# Patient Record
Sex: FEMALE | Race: WHITE
[De-identification: names, ages, dates, MRNs, and addresses within clinical notes are randomized per-mention and may not be internally consistent; named-entity substitution may affect disease eponyms.]

---

## 2018-03-04 ENCOUNTER — HOSPITAL ENCOUNTER (EMERGENCY)
Dept: HOSPITAL 45 - C.EDB | Age: 33
Discharge: HOME | End: 2018-03-04
Payer: COMMERCIAL

## 2018-03-04 VITALS
BODY MASS INDEX: 33.98 KG/M2 | WEIGHT: 191.8 LBS | HEIGHT: 62.99 IN | BODY MASS INDEX: 33.98 KG/M2 | HEIGHT: 62.99 IN | WEIGHT: 191.8 LBS

## 2018-03-04 VITALS — TEMPERATURE: 97.34 F

## 2018-03-04 VITALS — DIASTOLIC BLOOD PRESSURE: 70 MMHG | OXYGEN SATURATION: 96 % | HEART RATE: 69 BPM | SYSTOLIC BLOOD PRESSURE: 99 MMHG

## 2018-03-04 VITALS — OXYGEN SATURATION: 99 %

## 2018-03-04 DIAGNOSIS — G44.209: Primary | ICD-10-CM

## 2018-03-04 DIAGNOSIS — R11.0: ICD-10-CM

## 2018-03-04 DIAGNOSIS — R10.11: ICD-10-CM

## 2018-03-04 DIAGNOSIS — F17.200: ICD-10-CM

## 2018-03-04 DIAGNOSIS — N30.01: ICD-10-CM

## 2018-03-04 DIAGNOSIS — R56.9: ICD-10-CM

## 2018-03-04 DIAGNOSIS — R20.0: ICD-10-CM

## 2018-03-04 DIAGNOSIS — H53.149: ICD-10-CM

## 2018-03-04 LAB
ALBUMIN SERPL-MCNC: 3.9 GM/DL (ref 3.4–5)
ALP SERPL-CCNC: 81 U/L (ref 45–117)
ALT SERPL-CCNC: 22 U/L (ref 12–78)
AST SERPL-CCNC: 17 U/L (ref 15–37)
BASOPHILS # BLD: 0.01 K/UL (ref 0–0.2)
BASOPHILS NFR BLD: 0.2 %
BUN SERPL-MCNC: 8 MG/DL (ref 7–18)
CALCIUM SERPL-MCNC: 9 MG/DL (ref 8.5–10.1)
CO2 SERPL-SCNC: 25 MMOL/L (ref 21–32)
CREAT SERPL-MCNC: 0.9 MG/DL (ref 0.6–1.2)
EOS ABS #: 0.11 K/UL (ref 0–0.5)
EOSINOPHIL NFR BLD AUTO: 181 K/UL (ref 130–400)
GLUCOSE SERPL-MCNC: 86 MG/DL (ref 70–99)
HCT VFR BLD CALC: 43.2 % (ref 37–47)
HGB BLD-MCNC: 15.2 G/DL (ref 12–16)
IG#: 0.02 K/UL (ref 0–0.02)
IMM GRANULOCYTES NFR BLD AUTO: 19.5 %
INR PPP: 1.1 (ref 0.9–1.1)
LYMPHOCYTES # BLD: 1.28 K/UL (ref 1.2–3.4)
MCH RBC QN AUTO: 31.8 PG (ref 25–34)
MCHC RBC AUTO-ENTMCNC: 35.2 G/DL (ref 32–36)
MCV RBC AUTO: 90.4 FL (ref 80–100)
MONO ABS #: 0.54 K/UL (ref 0.11–0.59)
MONOCYTES NFR BLD: 8.2 %
NEUT ABS #: 4.6 K/UL (ref 1.4–6.5)
NEUTROPHILS # BLD AUTO: 1.7 %
NEUTROPHILS NFR BLD AUTO: 70.1 %
PHOSPHATE SERPL-MCNC: 3.2 MG/DL (ref 2.5–4.9)
PMV BLD AUTO: 10 FL (ref 7.4–10.4)
POTASSIUM SERPL-SCNC: 3.6 MMOL/L (ref 3.5–5.1)
PROT SERPL-MCNC: 8.1 GM/DL (ref 6.4–8.2)
PTT PATIENT: 29.4 SECONDS (ref 21–31)
RED CELL DISTRIBUTION WIDTH CV: 12.7 % (ref 11.5–14.5)
RED CELL DISTRIBUTION WIDTH SD: 41.6 FL (ref 36.4–46.3)
SODIUM SERPL-SCNC: 140 MMOL/L (ref 136–145)
WBC # BLD AUTO: 6.56 K/UL (ref 4.8–10.8)

## 2018-03-04 NOTE — DIAGNOSTIC IMAGING REPORT
SINGLE VIEW CHEST



CLINICAL HISTORY:  Seizure. Headache.



FINDINGS: An AP, portable, upright chest radiograph is obtained. No prior

studies are available for comparison at the time of dictation. The examination

is degraded by portable technique and patient rotation.  The cardiomediastinal

silhouette is unremarkable. There are low lung volumes. The lungs and pleural

spaces are clear. No pneumothorax is seen. The bony thorax is grossly intact.



IMPRESSION: No acute cardiopulmonary abnormality.







Electronically signed by:  Stevan Quick M.D.

3/4/2018 8:38 AM



Dictated Date/Time:  3/4/2018 8:38 AM

## 2018-03-04 NOTE — DIAGNOSTIC IMAGING REPORT
ULTRASOUND RIGHT UPPER QUADRANT ABDOMEN



CLINICAL HISTORY: Right upper quadrant abdominal pain. Nausea.



COMPARISON STUDY: Abdominal CT dated 6/5/2017.



TECHNIQUE: Real-time, grayscale, and color flow sonography of the right upper

quadrant of the abdomen was performed. Images are reviewed in the transverse and

longitudinal planes.



FINDINGS:



Liver: The liver is normal in size and echotexture. There is no intrahepatic

biliary ductal dilatation. The main portal vein is patent.



Gallbladder:  Small mobile gallstones are identified. There is no gallbladder

wall thickening or pericholecystic fluid. A sonographic Arredondo's sign is

reportedly absent. The common bile duct measures up to 0.5 cm in diameter.



Pancreas: Not well visualized due to overlying bowel gas.



Right kidney: Survey images of the right kidney demonstrate normal size and

echotexture. There is no hydronephrosis. There are nonobstructing right renal

calculi measuring up to 9 mm.



Ascites: None.





IMPRESSION:  



1. Cholelithiasis without sonographic evidence of acute cholecystitis.



2. Nonobstructing right renal calculi are identified. There is no

hydronephrosis.



3. The pancreas was not visualized due to overlying bowel gas.







Electronically signed by:  Stevan Quick M.D.

3/4/2018 9:18 AM



Dictated Date/Time:  3/4/2018 9:17 AM

## 2018-03-04 NOTE — EMERGENCY ROOM VISIT NOTE
History


First contact with patient:  07:35


Chief Complaint:  SEIZURE


Stated Complaint:  SEIZER,HEADACHE,NAUSEA


Nursing Triage Summary:  


Per patient's friend, "I went and got her from [place] and went to get her mom 


here.  She started convulsing about 10 minutes ago.  She has not taken her 


Prozac or Seraquil for 2 days."  Patient reports "I was feeling dizziness at 


[facility] in [place].  I sleep must of the way back.  I have no hx of seizure.  


I have a headache and abdominal pain."





History of Present Illness


The patient is a 32 year old female who presents to the Emergency Room with 

complaints of light sensitivity and a bad headache.  The patient states she was 

traveling home from [place], and stopped at [facility] in [place].  She states 

approximately 2 hours ago, while at [facility], she began experiencing dizziness, 

lightheadedness, and a bad headache associated with light sensitivity.  Patient 

states she got back in the car, and was feeling okay with the exception of some 

mild headache with nausea.  Patient states prior to this episode, she 

experienced some left arm tingling and numbness.  All traveling back with a 

male friend, the friend states approximately 45 minutes ago, the patient began 

gasping for air, and when he attempted to check her pulse and breathing, states 

she was not breathing and did not have a pulse for 2-5 minutes.  He states she 

then awoke up, and was completely normal.  She states a few minutes later, the 

patient began some mild twitching, then began twitching throughout her entire 

body, which she describes as "jumping out of her C".  He states this episode 

lasted for approximately 30 seconds.  The patient does not have a history of 

seizures.  She states while traveling, she has been 2 days without her Seroquel 

or Prozac because she did not have access to that, but when asked here in the 

emergency department, the male friend was advised by the patient to get the 

medications out of her black purse which was in the vehicle they were traveling 

and.  She states she has experienced similar symptoms in the past without the 

twitching when she has been without her medications.  The patient remembers the 

entire trip, but does not recall the twitching or seizure-like activity.  There 

was no loss of bowel or bladder function.  The patient denies any drug use, and 

states she had "a few sips of wine" last evening, but states "there was white 

stuff in it, and it tasted really bad".  The patient does admit to tobacco use 

and states she smokes 4 cigarettes per day.  She denies any chest pain or 

difficulty breathing.  She denies any ongoing paresthesias or weakness.  She 

denies any bowel or bladder dysfunction.  The patient does report some mild 

abdominal pain associated with nausea.





Review of Systems


A complete 10 point review of systems was reviewed with the patient with 

pertinent positives and negatives as per history of present illness. All else 

were negative.





Past Medical/Surgical History


Surgical Problems:


(1) Hx of  section








Family History





Patient reports no known family medical history.





Social History


Smoking Status:  Current Every Day Smoker


Alcohol Use:  occasionally


Marital Status:  in relationship


Housing Status:  lives with significant other


Occupation Status:  employed





Current/Historical Medications


Scheduled


Fluoxetine (Prozac), 20 MG PO QAM


Quetiapine Fumarate (Seroquel), 100 MG PO HS


Sulfa/Trimethoprim (Bactrim Ds 800MG/160MG), 1 TAB PO BID





Physical Exam


Vital Signs











  Date Time  Temp Pulse Resp B/P (MAP) Pulse Ox O2 Delivery O2 Flow Rate FiO2


 


3/4/18 10:51  69 16 99/70 96   


 


3/4/18 09:31  72 16 124/68 96 Room Air  


 


3/4/18 08:36  88 16 122/90 99 Room Air  


 


3/4/18 08:05  74      


 


3/4/18 08:00     99 Room Air  


 


3/4/18 07:26 36.3 86 18 123/82 97 Room Air  











Physical Exam


VITALS: Vitals are noted on the nurse's note and reviewed by myself.  Vital 

signs stable.


GENERAL: This is a 32-year-old white female, in no acute distress, 

nondiaphoretic, well-developed well-nourished.


SKIN: The skin was without rashes, erythema, edema, or bruising.  There is no 

tenting of the skin.  Capillary reflex less than 2 seconds.


HEAD: Normocephalic atraumatic.  


EARS: External auditory canals clear, tympanic membranes pearly gray without 

erythema or effusion bilaterally.


EYES: Pupils equal round and reactive to light and accommodation.  Conjunctivae 

without injection, sclerae without icterus.  Extraocular movements intact 

without nystagmus.  


NOSE: Patent, turbinates without inflammation or discharge.  No sinus 

tenderness.


MOUTH: Mucous membranes moist.  Tonsils are not enlarged.  Pharynx without 

erythema or exudate.  Uvula midline.  Airway patent.  Tongue does not deviate.  


NECK: Supple without nuchal rigidity.  No lymphadenopathy.  No thyromegaly.  

Cervical spine is nontender.  No JVD.


HEART: Regular rate and rhythm without murmurs gallops or rubs.


LUNGS: Clear to auscultation bilaterally without wheezes, rales or rhonchi.  No 

dullness to percussion.  No retractions or accessory muscle use.


ABDOMEN: Positive bowel sounds x 4.  Normal tympanic percussion.  Mild right 

upper quadrant tenderness, but otherwise soft, nontender, without masses or 

organomegaly.  Arredondo sign negative.  No guarding or rebound tenderness.


MUSCULOSKELETAL: No muscle atrophy, erythema, or edema noted.  Full range of 

motion without joint tenderness in all extremities.  No tenderness to 

palpation.  Normal gait.  Strength 5/5 throughout.


NEURO: Patient was alert and oriented to person place and time.  Normal 

sensation to light and sharp touch.  Deep tendon reflexes 2+ throughout.  No 

focal neurological deficits.





Medical Decision & Procedures


ER Provider


Diagnostic Interpretation:


SINGLE VIEW CHEST





CLINICAL HISTORY:  Seizure. Headache.





FINDINGS: An AP, portable, upright chest radiograph is obtained. No prior


studies are available for comparison at the time of dictation. The examination


is degraded by portable technique and patient rotation.  The cardiomediastinal


silhouette is unremarkable. There are low lung volumes. The lungs and pleural


spaces are clear. No pneumothorax is seen. The bony thorax is grossly intact.





IMPRESSION: No acute cardiopulmonary abnormality.











Electronically signed by:  Stevan Quick M.D.


3/4/2018 8:38 AM





Dictated Date/Time:  3/4/2018 8:38 AM








CT SCAN OF THE BRAIN WITHOUT IV CONTRAST





CLINICAL HISTORY: Seizure. Headache. Dizzy.





COMPARISON STUDY:  No priors.





TECHNIQUE: Unenhanced axial CT scan of the brain is performed from the vertex to


the skull base.  A dose lowering technique was utilized adhering to the


principles of ALARA.





CT DOSE: 537.48 mGy.cm





FINDINGS:





Brain parenchyma: The brain parenchyma is normal in appearance. There is no


hemorrhage, mass effect, or evidence of acute territorial ischemia by CT


criteria. Gray-white matter is preserved. No extra-axial fluid collection is


seen.





Ventricles, sulci, cisterns: Normal in configuration.





Intracranial vasculature: The visualized intracranial vasculature at the skull


base is normal in appearance.





Calvarium: Unremarkable.





Sinuses and mastoids: The visualized paranasal sinuses are clear. The mastoid


air cells are well pneumatized.





Orbits: The bony orbits are grossly intact.








IMPRESSION: No acute intracranial abnormality.











Electronically signed by:  Stevan Quick M.D.


3/4/2018 8:52 AM





Dictated Date/Time:  3/4/2018 8:50 AM








ULTRASOUND RIGHT UPPER QUADRANT ABDOMEN





CLINICAL HISTORY: Right upper quadrant abdominal pain. Nausea.





COMPARISON STUDY: Abdominal CT dated 2017.





TECHNIQUE: Real-time, grayscale, and color flow sonography of the right upper


quadrant of the abdomen was performed. Images are reviewed in the transverse and


longitudinal planes.





FINDINGS:





Liver: The liver is normal in size and echotexture. There is no intrahepatic


biliary ductal dilatation. The main portal vein is patent.





Gallbladder:  Small mobile gallstones are identified. There is no gallbladder


wall thickening or pericholecystic fluid. A sonographic Arredondo's sign is


reportedly absent. The common bile duct measures up to 0.5 cm in diameter.





Pancreas: Not well visualized due to overlying bowel gas.





Right kidney: Survey images of the right kidney demonstrate normal size and


echotexture. There is no hydronephrosis. There are nonobstructing right renal


calculi measuring up to 9 mm.





Ascites: None.








IMPRESSION:  





1. Cholelithiasis without sonographic evidence of acute cholecystitis.





2. Nonobstructing right renal calculi are identified. There is no


hydronephrosis.





3. The pancreas was not visualized due to overlying bowel gas.











Electronically signed by:  Stevan Quick M.D.


3/4/2018 9:18 AM





Dictated Date/Time:  3/4/2018 9:17 AM





Laboratory Results


3/4/18 07:55








Red Blood Count 4.78, Mean Corpuscular Volume 90.4, Mean Corpuscular Hemoglobin 

31.8, Mean Corpuscular Hemoglobin Concent 35.2, Mean Platelet Volume 10.0, 

Neutrophils (%) (Auto) 70.1, Lymphocytes (%) (Auto) 19.5, Monocytes (%) (Auto) 

8.2, Eosinophils (%) (Auto) 1.7, Basophils (%) (Auto) 0.2, Neutrophils # (Auto) 

4.60, Lymphocytes # (Auto) 1.28, Monocytes # (Auto) 0.54, Eosinophils # (Auto) 

0.11, Basophils # (Auto) 0.01





3/4/18 07:55

















Test


  3/4/18


00:00 3/4/18


07:55 3/4/18


08:04 3/4/18


08:25


 


Urine Color DK YELLOW    


 


Urine Appearance CLOUDY (CLEAR)    


 


Urine pH 5.0 (4.5-7.5)    


 


Urine Specific Gravity


  1.028


(1.000-1.030) 


  


  


 


 


Urine Protein TRACE (NEG)    


 


Urine Glucose (UA) NEG (NEG)    


 


Urine Ketones 2+ (NEG)    


 


Urine Occult Blood NEG (NEG)    


 


Urine Nitrite NEG (NEG)    


 


Urine Bilirubin NEG (NEG)    


 


Urine Urobilinogen NEG (NEG)    


 


Urine Leukocyte Esterase TRACE (NEG)    


 


Urine WBC (Auto)


  10-30 /hpf


(0-5) 


  


  


 


 


Urine RBC (Auto)


  5-10 /hpf


(0-4) 


  


  


 


 


Urine Hyaline Casts (Auto)


  5-10 /lpf


(0-5) 


  


  


 


 


Urine Epithelial Cells (Auto) >30 /lpf (0-5)    


 


Urine Bacteria (Auto) 1+ (NEG)    


 


Urine Pregnancy Test NEG (NEG)    


 


Urine Opiates Screen NEG (NEG)    


 


Urine Methadone, Qualitative NEG (NEG)    


 


Urine Barbiturates NEG (NEG)    


 


Urine Phencyclidine (PCP)


Level NEG (NEG) 


  


  


  


 


 


Ur


Amphetamine/Methamphetamine NEG (NEG) 


  


  


  


 


 


MDMA (Ecstasy) Screen NEG (NEG)    


 


Urine Benzodiazepines Screen NEG (NEG)    


 


Urine Cocaine Metabolite NEG (NEG)    


 


Urine Marijuana (THC) NEG (NEG)    


 


White Blood Count


  


  6.56 K/uL


(4.8-10.8) 


  


 


 


Red Blood Count


  


  4.78 M/uL


(4.2-5.4) 


  


 


 


Hemoglobin


  


  15.2 g/dL


(12.0-16.0) 


  


 


 


Hematocrit  43.2 % (37-47)   


 


Mean Corpuscular Volume


  


  90.4 fL


() 


  


 


 


Mean Corpuscular Hemoglobin


  


  31.8 pg


(25-34) 


  


 


 


Mean Corpuscular Hemoglobin


Concent 


  35.2 g/dl


(32-36) 


  


 


 


Platelet Count


  


  181 K/uL


(130-400) 


  


 


 


Mean Platelet Volume


  


  10.0 fL


(7.4-10.4) 


  


 


 


Neutrophils (%) (Auto)  70.1 %   


 


Lymphocytes (%) (Auto)  19.5 %   


 


Monocytes (%) (Auto)  8.2 %   


 


Eosinophils (%) (Auto)  1.7 %   


 


Basophils (%) (Auto)  0.2 %   


 


Neutrophils # (Auto)


  


  4.60 K/uL


(1.4-6.5) 


  


 


 


Lymphocytes # (Auto)


  


  1.28 K/uL


(1.2-3.4) 


  


 


 


Monocytes # (Auto)


  


  0.54 K/uL


(0.11-0.59) 


  


 


 


Eosinophils # (Auto)


  


  0.11 K/uL


(0-0.5) 


  


 


 


Basophils # (Auto)


  


  0.01 K/uL


(0-0.2) 


  


 


 


RDW Standard Deviation


  


  41.6 fL


(36.4-46.3) 


  


 


 


RDW Coefficient of Variation


  


  12.7 %


(11.5-14.5) 


  


 


 


Immature Granulocyte % (Auto)  0.3 %   


 


Immature Granulocyte # (Auto)


  


  0.02 K/uL


(0.00-0.02) 


  


 


 


Prothrombin Time


  


  11.1 SECONDS


(9.0-12.0) 


  


 


 


Prothromb Time International


Ratio 


  1.1 (0.9-1.1) 


  


  


 


 


Activated Partial


Thromboplast Time 


  29.4 SECONDS


(21.0-31.0) 


  


 


 


Partial Thromboplastin Ratio  1.1   


 


Anion Gap


  


  8.0 mmol/L


(3-11) 


  


 


 


Est Creatinine Clear Calc


Drug Dose 


  93.8 ml/min 


  


  


 


 


Estimated GFR (


American) 


  98.1 


  


  


 


 


Estimated GFR (Non-


American 


  84.6 


  


  


 


 


BUN/Creatinine Ratio  8.8 (10-20)   


 


Calcium Level


  


  9.0 mg/dl


(8.5-10.1) 


  


 


 


Phosphorus Level


  


  3.2 mg/dl


(2.5-4.9) 


  


 


 


Magnesium Level


  


  2.4 mg/dl


(1.8-2.4) 


  


 


 


Total Bilirubin


  


  0.4 mg/dl


(0.2-1) 


  


 


 


Aspartate Amino Transf


(AST/SGOT) 


  17 U/L (15-37) 


  


  


 


 


Alanine Aminotransferase


(ALT/SGPT) 


  22 U/L (12-78) 


  


  


 


 


Alkaline Phosphatase


  


  81 U/L


() 


  


 


 


Total Protein


  


  8.1 gm/dl


(6.4-8.2) 


  


 


 


Albumin


  


  3.9 gm/dl


(3.4-5.0) 


  


 


 


Globulin


  


  4.2 gm/dl


(2.5-4.0) 


  


 


 


Albumin/Globulin Ratio  0.9 (0.9-2)   


 


Thyroid Stimulating Hormone


(TSH) 


  3.670 uIu/ml


(0.300-4.500) 


  


 


 


Bedside Glucose


  


  


  78 mg/dl


(70-90) 


 


 


Ethyl Alcohol mg/dL


  


  


  


  < 3.0 mg/dl


(0-3)











Medications Administered











 Medications


  (Trade)  Dose


 Ordered  Sig/Candida


 Route  Start Time


 Stop Time Status Last Admin


Dose Admin


 


 Sodium Chloride  1,000 ml @ 


 999 mls/hr  Q1H1M STAT


 IV  3/4/18 07:51


 3/4/18 08:51 DC 3/4/18 08:24


999 MLS/HR


 


 Ondansetron HCl


  (Zofran Inj)  4 mg  NOW  STAT


 IV  3/4/18 07:51


 3/4/18 07:55 DC 3/4/18 08:23


4 MG


 


 Ketorolac


 Tromethamine


  (Toradol Inj)  30 mg  NOW  STAT


 IV  3/4/18 08:01


 3/4/18 08:02 DC 3/4/18 08:23


30 MG


 


 Diphenhydramine


 HCl


  (Benadryl Inj)  50 mg  NOW  STAT


 IV  3/4/18 08:01


 3/4/18 08:02 DC 3/4/18 08:23


50 MG


 


 Metoclopramide HCl


  (Reglan Inj)  10 mg  NOW  STAT


 IV.  3/4/18 08:01


 3/4/18 08:02 DC 3/4/18 08:22


10 MG


 


 Trimethoprim/


 Sulfamethoxazole


  (Septra Ds 800/


 160MG Tab)  1 tab  NOW  STAT


 PO  3/4/18 10:28


 3/4/18 10:29 DC 3/4/18 10:28


1 TAB











ECG Per My Interpretation


Indication:  syncope


Rate (beats per minute):  71


Rhythm:  sinus with SA


Findings:  no acute ischemic change, no ectopy


Comparison ECG Date:  no prior available





ED Course


Patient was seen and evaluated as above.





IV access obtained, labs drawn.  The patient was given 1 L normal saline 

solution, Benadryl, Reglan, Zofran, and Toradol for her migraine.





EKG performed.





Imaging performed and reviewed by myself and radiologist as above.





The patient was reassessed and I discussed the findings of workup with the 

patient at bedside.  The patient did provide a urine sample at this time.





Urinalysis and urine drug screen reviewed.  I discussed the findings with the 

patient at bedside.





The patient was given her first dose of Bactrim here in the emergency 

department.





She was encouraged to take her medications from home.





Discharge instructions reviewed, the patient was discharged home in good 

condition.  All questions answered to patient's satisfaction.





Medical Decision


This is a 32-year-old white female patient presents to the emergency department 

today with a male friend who states the patient experienced a seizure while 

riding in his vehicle that he was driving.  The patient apparently had an 

episode of shaking and tremors, but does recall events surrounding the seizure, 

and did not suffer any incontinence.  The friend also states the patient did 

not have a pulse and was not breathing for several minutes.  The patient's 

neurologic and cardiovascular examination is completely normal while here in 

the emergency department with the exception of the headache and light 

sensitivity.  I do not suspect that the patient suffered either a seizure or a 

cardiac arrest, as described by her friend.  The patient's laboratory workup 

here in the emergency department was overall negative.  She did complain of 

some right upper quadrant abdominal tenderness, so abdominal ultrasound was 

performed and negative for acute cholecystitis.  Her urine drug screen was 

negative.  She does not have a history of seizures.  Patient's urinalysis is 

suspicious for UTI.  The patient states she has been feeling off for a few days

, and suspected she may have a urinary tract infection.  She does deny any 

dysuria, urinary frequency, or urinary hesitancy.  Her headache did improve 

with a migraine cocktail.  I suspect a possible complicated migraine versus 

medication withdrawal symptoms.  The patient has experienced similar symptoms 

in the past without the shaking.  I suspect the patient was sleeping and 

possibly shaking in her sleep as the cause of the tremors.  She was encouraged 

to follow-up outpatient with her primary care provider for ongoing management 

and evaluation.





I did speak with my attending throughout the course of the patient's care.





I did discuss findings of cholelithiasis with the patient and encourage follow-

up outpatient.





differential diagnosis includes seizure, cardiac arrest, acute coronary syndrome

,  urinary tract infection, metabolic or electrolyte abnormality, drug 

withdrawal, drug overdose, alcohol overdose, renal or hepatic abnormality, 

pneumonia, stroke, TIA, migraine, headache, pancreatitis, malignancy, 

cholecystitis, and others





[place] Drug Monitoring Program


Search Results:  patient reviewed within database, no issues identified





Medication Reconcilliation


Current Medication List:  was personally reviewed by me





Blood Pressure Screening


Patient's blood pressure:  Normal blood pressure





Impression





 Primary Impression:  


 Headache


 Additional Impressions:  


 Seizure-like activity


 Urinary tract infection


 Right upper quadrant abdominal pain





Departure Information


Dispostion


Home / Self-Care





Condition


GOOD





Prescriptions





Sulfa/Trimethoprim (Bactrim Ds 800MG/160MG)  Tab


1 TAB PO BID for 7 Days, #14 TAB


   Prov: Nia Redding PA-C         3/4/18





Referrals


No Doctor, Assigned (PCP)





Patient Instructions


ED Headache Tension, ED UTI Cystitis Female, My [facility]





Additional Instructions





You have been treated in the Emergency Department for seizure-like activity. As 

discussed, work-up here in the ED was negative and I do not suspect an actual 

seizure. You were diagnosed with a Urinary Tract Infection (UTI).





You have been prescribed Bactrim to be taken twice daily x7 days. This is an 

antibiotic. All antibiotics have the potential to cause diarrhea. Stop this 

medication and contact a medical provider if you were to develop any 

significant adverse side effects including: wheezing, shortness of breath, 

passing out, vomiting, or a diffuse rash. Always take antibiotics as directed 

and COMPLETE the ENTIRE course regardless of the improvement of your symptoms.





Drink plenty of water and stay well hydrated.





Always take all medication as prescribed and on a regular basis. You may take 

your regular medication now.





Ibuprofen(Motrin, Advil) may be used for fever or pain.  Use 600mg every six 

hours as needed.  Take with food.  Avoid using more than 2400mg in a 24 hour 

period.  Do not use 2400mg per day for more than three consecutive days without 

physician direction.  Prolonged inappropriate use can lead to stomach upset or 

ulcers. 


(AND/OR)


Acetaminophen(Tylenol) may be used for fever or pain.  Use 1000mg every six 

hours as needed.  Avoid using more than 3000mg in a 24 hour period.  





As with any trip to the Emergency Department, you should follow-up with your 

Primary Care Provider in 2-3 days from today's visit.





Return to the emergency department if your symptoms persist despite treatment 

plan outlined above or if the following symptoms occur: ongoing seizure-like 

activity, increased fevers, chills, low back pain, nausea/vomiting, or blood in 

your urine.





Problem Qualifiers








 Primary Impression:  


 Headache


 Headache type:  tension-type  Headache chronicity pattern:  acute headache  

Intractability:  not intractable  Qualified Codes:  G44.209 - Tension-type 

headache, unspecified, not intractable


 Additional Impressions:  


 Urinary tract infection


 Urinary tract infection type:  acute cystitis  Hematuria presence:  with 

hematuria  Qualified Codes:  N30.01 - Acute cystitis with hematuria

## 2018-03-04 NOTE — DIAGNOSTIC IMAGING REPORT
CT SCAN OF THE BRAIN WITHOUT IV CONTRAST



CLINICAL HISTORY: Seizure. Headache. Dizzy.



COMPARISON STUDY:  No priors.



TECHNIQUE: Unenhanced axial CT scan of the brain is performed from the vertex to

the skull base.  A dose lowering technique was utilized adhering to the

principles of ALARA.



CT DOSE: 537.48 mGy.cm



FINDINGS:



Brain parenchyma: The brain parenchyma is normal in appearance. There is no

hemorrhage, mass effect, or evidence of acute territorial ischemia by CT

criteria. Gray-white matter is preserved. No extra-axial fluid collection is

seen.



Ventricles, sulci, cisterns: Normal in configuration.



Intracranial vasculature: The visualized intracranial vasculature at the skull

base is normal in appearance.



Calvarium: Unremarkable.



Sinuses and mastoids: The visualized paranasal sinuses are clear. The mastoid

air cells are well pneumatized.



Orbits: The bony orbits are grossly intact.





IMPRESSION: No acute intracranial abnormality.







Electronically signed by:  Stevan uQick M.D.

3/4/2018 8:52 AM



Dictated Date/Time:  3/4/2018 8:50 AM

## 2018-08-03 ENCOUNTER — HOSPITAL ENCOUNTER (EMERGENCY)
Dept: HOSPITAL 45 - C.EDB | Age: 33
LOS: 1 days | Discharge: HOME | End: 2018-08-04
Payer: COMMERCIAL

## 2018-08-03 VITALS — TEMPERATURE: 98.06 F

## 2018-08-03 VITALS
HEIGHT: 62.99 IN | BODY MASS INDEX: 34.06 KG/M2 | WEIGHT: 192.24 LBS | WEIGHT: 192.24 LBS | HEIGHT: 62.99 IN | BODY MASS INDEX: 34.06 KG/M2

## 2018-08-03 DIAGNOSIS — Z91.048: ICD-10-CM

## 2018-08-03 DIAGNOSIS — R53.83: ICD-10-CM

## 2018-08-03 DIAGNOSIS — H53.8: Primary | ICD-10-CM

## 2018-08-03 DIAGNOSIS — Z79.899: ICD-10-CM

## 2018-08-03 DIAGNOSIS — N39.0: ICD-10-CM

## 2018-08-03 DIAGNOSIS — Z87.891: ICD-10-CM

## 2018-08-03 DIAGNOSIS — Z87.440: ICD-10-CM

## 2018-08-03 LAB
BASOPHILS # BLD: 0.02 K/UL (ref 0–0.2)
BASOPHILS NFR BLD: 0.4 %
BUN SERPL-MCNC: 13 MG/DL (ref 7–18)
CALCIUM SERPL-MCNC: 8.6 MG/DL (ref 8.5–10.1)
CO2 SERPL-SCNC: 23 MMOL/L (ref 21–32)
CREAT SERPL-MCNC: 1 MG/DL (ref 0.6–1.2)
EOS ABS #: 0.14 K/UL (ref 0–0.5)
EOSINOPHIL NFR BLD AUTO: 155 K/UL (ref 130–400)
GLUCOSE SERPL-MCNC: 91 MG/DL (ref 70–99)
HCT VFR BLD CALC: 40.6 % (ref 37–47)
HGB BLD-MCNC: 14.4 G/DL (ref 12–16)
IG#: 0.02 K/UL (ref 0–0.02)
IMM GRANULOCYTES NFR BLD AUTO: 24.1 %
LYMPHOCYTES # BLD: 1.33 K/UL (ref 1.2–3.4)
MCH RBC QN AUTO: 33.6 PG (ref 25–34)
MCHC RBC AUTO-ENTMCNC: 35.5 G/DL (ref 32–36)
MCV RBC AUTO: 94.9 FL (ref 80–100)
MONO ABS #: 0.47 K/UL (ref 0.11–0.59)
MONOCYTES NFR BLD: 8.5 %
NEUT ABS #: 3.53 K/UL (ref 1.4–6.5)
NEUTROPHILS # BLD AUTO: 2.5 %
NEUTROPHILS NFR BLD AUTO: 64.1 %
PMV BLD AUTO: 10.1 FL (ref 7.4–10.4)
POTASSIUM SERPL-SCNC: 3.3 MMOL/L (ref 3.5–5.1)
RED CELL DISTRIBUTION WIDTH CV: 11.9 % (ref 11.5–14.5)
RED CELL DISTRIBUTION WIDTH SD: 40.1 FL (ref 36.4–46.3)
SODIUM SERPL-SCNC: 138 MMOL/L (ref 136–145)
WBC # BLD AUTO: 5.51 K/UL (ref 4.8–10.8)

## 2018-08-03 NOTE — EMERGENCY ROOM VISIT NOTE
History


Report prepared by Matt:  Radha Gee


Under the Supervision of:  Dr. Chandu Keyes M.D.


First contact with patient:  23:06


Chief Complaint:  PAIN (GENERALIZED)


Stated Complaint:  NAUSEA, BLURRED VISION, AGGITATION, MEMORY LOSS





History of Present Illness


The patient is a 33 year old female who presents to the Emergency Room with 

complaint of blurry vision.  Notes this started earlier today and is associated 

with lightheadened, nausea, poor concentration, loss of appetite and fatigue.  

Admits urinary symptoms including increased frequency.  Did have UTI last week 

and treat with abx which she finished.  She notes no chest pain, shob, syncope, 

headache, nor other symptoms.  Denies tick bites, trauma, chemical exposures, 

drug use nor pregnancy.  LMP 2-4 months ago and on Depo shot.  Take prozac 

daily.  No new medications.  Nothing makes symptoms better nor worse.  No 

medications taken for this evening's symptoms.





   Source of History:  patient


   Onset:  a few days pta


   Position:  other (global)


   Quality:  other (generalized pain)


   Associated Symptoms:  + nausea, + urinary symptoms (increased frequency in 

urination )


Note:


Positive poor concentration, loss of appetite, and blurry vision. Negative 

dysuria, tick bites, recent trauma, exposure to chemicals, drug use, or a 

chance of pregnancy





Review of Systems


See HPI for pertinent positives & negatives. A total of 10 systems reviewed and 

were otherwise negative.





Past Medical & Surgical


Medical Problems:


(1) Gestational diabetes


(2) UTI (urinary tract infection)


Surgical Problems:


(1) Hx of  section








Family History





Patient reports no known family medical history.





Social History


Smoking Status:  Former Smoker


Alcohol Use:  occasionally


Marital Status:  


Housing Status:  lives with significant other


Occupation Status:  unemployed





Current/Historical Medications


Scheduled


Cephalexin Monohydrate (Keflex), 500 MG PO TID


Fluoxetine (Prozac), 20 MG PO QAM


Quetiapine Fumarate (Seroquel), 100 MG PO HS





Allergies


Coded Allergies:  


     Dust (Unverified  Allergy, Intermediate, SINUS ATTACK, 18)





Physical Exam


Vital Signs











  Date Time  Temp Pulse Resp B/P (MAP) Pulse Ox O2 Delivery O2 Flow Rate FiO2


 


18 01:30  62 18 122/84 100   


 


18 00:23  65 18 123/93 100 Room Air  


 


8/3/18 22:39 36.7 78 20 136/85 97 Room Air  











Physical Exam


GENERAL: Patient is anxious appearing and in no acute distress.


EYES: No scleral icterus, unremarkable pupils.


ENT: Mucous membranes moist, no nasal congestion.


NECK: No masses appreciated, no meningismus, trachea is midline.


RESPIRATORY: No dyspnea. Clear to auscultation and equal bilaterally. No wheeze

, no rhonchi.


CARDIOVASCULAR: Regular rate and rhythm.  No murmurs, rubs, gallops appreciated.


GASTROINTESTINAL: Abdomen soft, nontender, no peritonitis.  Bowel sounds 

positive.  No masses appreciated.


BACK: No midline tenderness, no CVA tenderness


EXTREMITIES: Normal motion all extremities, no cyanosis, no edema.


NEUROLOGIC: Alert and oriented, no acute motor or sensory deficits, no focal 

weakness, cranial nerves grossly intact.


SKIN: No rash, no jaundice, no diaphoresis.





Medical Decision & Procedures


ER Provider


Diagnostic Interpretation:


Radiology results and stated below per my review and radiologist interpretation:








CT HEAD





Comparison is made to prior CT head on 3/4/2018. 





No acute intracranial abnormality identified. 





Radiologist:        Jaguar Edwards M.D.         Phone:      [phone]





Laboratory Results


8/3/18 23:05








Red Blood Count 4.28, Mean Corpuscular Volume 94.9, Mean Corpuscular Hemoglobin 

33.6, Mean Corpuscular Hemoglobin Concent 35.5, Mean Platelet Volume 10.1, 

Neutrophils (%) (Auto) 64.1, Lymphocytes (%) (Auto) 24.1, Monocytes (%) (Auto) 

8.5, Eosinophils (%) (Auto) 2.5, Basophils (%) (Auto) 0.4, Neutrophils # (Auto) 

3.53, Lymphocytes # (Auto) 1.33, Monocytes # (Auto) 0.47, Eosinophils # (Auto) 

0.14, Basophils # (Auto) 0.02





8/3/18 23:05

















Test


  8/3/18


23:05


 


White Blood Count


  5.51 K/uL


(4.8-10.8)


 


Red Blood Count


  4.28 M/uL


(4.2-5.4)


 


Hemoglobin


  14.4 g/dL


(12.0-16.0)


 


Hematocrit 40.6 % (37-47) 


 


Mean Corpuscular Volume


  94.9 fL


()


 


Mean Corpuscular Hemoglobin


  33.6 pg


(25-34)


 


Mean Corpuscular Hemoglobin


Concent 35.5 g/dl


(32-36)


 


Platelet Count


  155 K/uL


(130-400)


 


Mean Platelet Volume


  10.1 fL


(7.4-10.4)


 


Neutrophils (%) (Auto) 64.1 % 


 


Lymphocytes (%) (Auto) 24.1 % 


 


Monocytes (%) (Auto) 8.5 % 


 


Eosinophils (%) (Auto) 2.5 % 


 


Basophils (%) (Auto) 0.4 % 


 


Neutrophils # (Auto)


  3.53 K/uL


(1.4-6.5)


 


Lymphocytes # (Auto)


  1.33 K/uL


(1.2-3.4)


 


Monocytes # (Auto)


  0.47 K/uL


(0.11-0.59)


 


Eosinophils # (Auto)


  0.14 K/uL


(0-0.5)


 


Basophils # (Auto)


  0.02 K/uL


(0-0.2)


 


RDW Standard Deviation


  40.1 fL


(36.4-46.3)


 


RDW Coefficient of Variation


  11.9 %


(11.5-14.5)


 


Immature Granulocyte % (Auto) 0.4 % 


 


Immature Granulocyte # (Auto)


  0.02 K/uL


(0.00-0.02)


 


Urine Color YELLOW 


 


Urine Appearance CLOUDY (CLEAR) 


 


Urine pH 5.0 (4.5-7.5) 


 


Urine Specific Gravity


  1.028


(1.000-1.030)


 


Urine Protein 1+ (NEG) 


 


Urine Glucose (UA) NEG (NEG) 


 


Urine Ketones NEG (NEG) 


 


Urine Occult Blood 2+ (NEG) 


 


Urine Nitrite NEG (NEG) 


 


Urine Bilirubin NEG (NEG) 


 


Urine Urobilinogen NEG (NEG) 


 


Urine Leukocyte Esterase MODERATE (NEG) 


 


Urine WBC (Auto) >30 /hpf (0-5) 


 


Urine RBC (Auto) >30 /hpf (0-4) 


 


Urine Hyaline Casts (Auto) 1-5 /lpf (0-5) 


 


Urine Epithelial Cells (Auto) >30 /lpf (0-5) 


 


Urine Bacteria (Auto) 1+ (NEG) 


 


Urine Pregnancy Test NEG (NEG) 


 


Anion Gap


  7.0 mmol/L


(3-11)


 


Est Creatinine Clear Calc


Drug Dose 83.8 ml/min 


 


 


Estimated GFR (


American) 85.7 


 


 


Estimated GFR (Non-


American 74.0 


 


 


BUN/Creatinine Ratio 12.8 (10-20) 


 


Calcium Level


  8.6 mg/dl


(8.5-10.1)


 


Lyme Disease IgG Antibody NEG (NEG) 


 


Lyme Disease IgM Antibody NEG (NEG) 





Laboratory results as reviewed by me.





Medications Administered











 Medications


  (Trade)  Dose


 Ordered  Sig/Candida


 Route  Start Time


 Stop Time Status Last Admin


Dose Admin


 


 Sodium Chloride  1,000 ml @ 


 999 mls/hr  Q1H1M STAT


 IV  8/3/18 23:13


 18 00:13 DC 8/3/18 23:39


999 MLS/HR


 


 Ceftriaxone Sodium


  (Rocephin Inj)  1 gm  NOW  STAT


 IV  18 00:30


 18 00:31 DC 18 00:48


1 GM











ED Course


2309: The patient was evaluated in room B9. A complete history and physical 

exam was performed.





2313: Ordered Sodium Chloride 1000 ml @ 999 mls/hr IV





0030: Ordered Rocephin Inj 1 gm IV





0038: I checked on the patient at this time. She states she feels a little bit 

cold after the IV, but other than that she feels better. She is agreeable to 

trying another round of antibiotics for a UTI. She agrees she will try to get a 

PCP. Discussed results and discharge instructions: She verbalized understanding 

and agreement.   The patient is ready for discharge.





Medical Decision


Differential: Sepsis, Infectious (UTI/Pneumonia/Meningitis/etc), Metabolic/

Electrolyte Abnormality, Cardiac, Dehydration, Anemia, Hepatic, Endocrine, 

Toxicologic, Neurologic, amongst other pathologies entertained.





33 yr old female with vague complaints of fatigue, blurry vision, lightheaded 

and then more definitive symptoms of urinary frequency.  UA consistent with UTI 

she had last time which ended up growing out E-coli.  CT head as she has these 

multiple neuro complaints though ct is negative.  She is found sleeping in no 

distress on several occasions.  Labs otherwise looking OK.  Lyme negative.  She 

does not have meningitis nor encephalitis.  Symptoms not consistent with [place] nor anaplasmosis.  She will follow up with PCP who she understands she 

needs to set up.  Aware RTED if worsening or other concerns.





Head Trauma


GCS Score:  15





Medication Reconcilliation


Current Medication List:  was personally reviewed by me





Blood Pressure Screening


Patient's blood pressure:  Normal blood pressure


Blood pressure disposition:  Did not require urgent referral





Impression





 Primary Impression:  


 Urinary tract infection


 Additional Impressions:  


 Fatigue


 Blurry vision, bilateral





Scribe Attestation


The scribe's documentation has been prepared under my direction and personally 

reviewed by me in its entirety. I confirm that the note above accurately 

reflects all work, treatment, procedures, and medical decision making performed 

by me.





Departure Information


Dispostion


Home / Self-Care





Prescriptions





Cephalexin Monohydrate (KEFLEX) 500 Mg Cap


500 MG PO TID for 7 Days, #21 CAP


   Prov: Chandu Keyes M.D.         18





Referrals


No Doctor, Assigned (PCP)





Forms


HOME CARE DOCUMENTATION FORM,                                                 

               IMPORTANT VISIT INFORMATION, WORK / SCHOOL INSTRUCTIONS





Patient Instructions


My [facility]





Additional Instructions





It is very important you follow up with primary care provider.


Keep well hydrated and take it easy the next few days.


Return if worsening symptoms, fevers, severe headache, passing out or other 

concerns.


We are always here to help.


Take all antibiotics as prescribed.





Problem Qualifiers

## 2018-08-04 VITALS — HEART RATE: 62 BPM | OXYGEN SATURATION: 100 % | DIASTOLIC BLOOD PRESSURE: 84 MMHG | SYSTOLIC BLOOD PRESSURE: 122 MMHG

## 2018-08-04 NOTE — DIAGNOSTIC IMAGING REPORT
CT SCAN OF THE BRAIN WITHOUT IV CONTRAST



CLINICAL HISTORY: Generalized weakness. Blurry vision.



COMPARISON STUDY:  CT of the brain dated 3/4/2018.



TECHNIQUE: Unenhanced axial CT scan of the brain is performed from the vertex to

the skull base.  A dose lowering technique was utilized adhering to the

principles of ALARA.



CT DOSE: 537.48 mGy.cm



FINDINGS:



Brain parenchyma: The brain parenchyma is normal in appearance. There is no

hemorrhage, mass effect, or evidence of acute territorial ischemia by CT

criteria. Gray-white matter is preserved. No extra-axial fluid collection is

seen.



Ventricles, sulci, cisterns: Normal in configuration.



Intracranial vasculature: The visualized intracranial vasculature at the skull

base is normal in appearance.



Calvarium: Unremarkable.



Sinuses and mastoids: The visualized paranasal sinuses are clear. The mastoid

air cells are well pneumatized.



Orbits: The bony orbits are grossly intact.





IMPRESSION: No acute intracranial abnormality.







Electronically signed by:  Stevan Quick M.D.

8/4/2018 8:12 AM



Dictated Date/Time:  8/4/2018 8:10 AM

## 2018-08-06 NOTE — PHARMACY PROGRESS NOTE
ED Pharmacist Culture FollowUp


Date of Service:


Aug 6, 2018.





Patient was sent home with a prescription for cephalexin, which should cover 

the E. coli growing from the patient's urine culture, based on reported 

sensitivity to cefazolin.